# Patient Record
Sex: MALE | URBAN - METROPOLITAN AREA
[De-identification: names, ages, dates, MRNs, and addresses within clinical notes are randomized per-mention and may not be internally consistent; named-entity substitution may affect disease eponyms.]

---

## 2024-02-12 ENCOUNTER — EMERGENCY (EMERGENCY)
Facility: HOSPITAL | Age: 39
LOS: 1 days | Discharge: HOME | End: 2024-02-12
Attending: EMERGENCY MEDICINE
Payer: MEDICAID

## 2024-02-12 NOTE — ED ADULT NURSE NOTE - OBJECTIVE STATEMENT
pt arrives to ED in cardiac arrest with CPR in progress on arrival. pt intubated with 7.5 ETT, 20g to left AC present, given 1 bicarb, 2mg IN Narcan and 7 Epi as well as 2 shocks by EMS en route to ED. manual ventilations in progress as well. no signs of trauma noted on exam, mottling noted to extremities and pupils fixed + dilated bilaterally. code blue activated and pt brought to critical care.

## 2024-02-12 NOTE — ED PROVIDER NOTE - OBJECTIVE STATEMENT
Patient is a 41yo F with PMHx of seizure disorder and EtOH abuse who presents to the ED in cardiac arrest. Patient was seen going into a liquor store at 9am this morning, found down and pulseless by EMS at 9:23. Patient got IN Narcan, Epi x7. Was initially in asystole, then PEA, then pulseless vtach, shocked once. No ROSC. Intubated in the field. No cardiac activity noted on POCUS. TOD called at 10:16am.   No ID was found on the patient. Patient was wearing a bracelet from prior visit on 2/9/24 with the name: Vianca Renteria. Chart review revaled his name to be Manny Rice. He was here 3 days ago for alcohol intoxication. Attempted to reach family at number listed in chart with no success.    ED : Carl (attempted to call family). Patient is a 41yo F with PMHx of seizure disorder and EtOH abuse who presents to the ED in cardiac arrest. Patient was seen going into a liquor store at 9am this morning, found down and pulseless by EMS at 9:23. Patient got IN Narcan, Epi x7. Was initially in asystole, then PEA, then pulseless vtach, shocked once. No ROSC. Intubated in the field. No cardiac activity noted on POCUS. TOD called at 10:16am.   No ID was found on the patient. Patient was wearing a bracelet from prior visit on 2/9/24 with the name: Vianca Renteria. Chart review revealed his name to be Manny Rice. He was here 3 days ago for alcohol intoxication. Attempted to reach family at number listed in chart with no success.    ED : Carl (attempted to call family).

## 2024-02-12 NOTE — ED PROVIDER NOTE - PHYSICAL EXAMINATION
Gen: intubated, compressions in progress  HEENT: Normocephalic atraumatic. Pupils fixed and dilated  CV: No cardiac activity noted on monitor.  Pulm: +chest rise, +BL breath sounds  Abdomen: soft non distended  Musculoskeletal:  No gross deformity.   Skin: No rashes or lesions. Cool extremities  Neurologic: No cough or gag reflex. Gen: intubated, compressions in progress  HEENT: Normocephalic atraumatic. Pupils fixed and dilated  CV: No cardiac activity noted on monitor.  Pulm: +chest rise, +BL breath sounds with PPV  Abdomen: soft non distended  Musculoskeletal:  No gross deformity.   Skin: No rashes or lesions. Cool extremities  Neurologic: No cough or gag reflex.

## 2024-02-12 NOTE — ED PROVIDER NOTE - ATTENDING CONTRIBUTION TO CARE
I, Renny Thompson MD, performed the initial face to face bedside interview with this patient regarding history of present illness, review of symptoms and relevant past medical, social and family history.  I completed an independent physical examination.  I was the initial provider who evaluated this patient. I have signed out the follow up of any pending tests (i.e. labs, radiological studies) to the resident.  I have communicated the patient’s plan of care and disposition with the resident.